# Patient Record
Sex: FEMALE
[De-identification: names, ages, dates, MRNs, and addresses within clinical notes are randomized per-mention and may not be internally consistent; named-entity substitution may affect disease eponyms.]

---

## 2019-02-04 ENCOUNTER — HOSPITAL ENCOUNTER (EMERGENCY)
Dept: HOSPITAL 14 - H.ER | Age: 24
Discharge: HOME | End: 2019-02-04
Payer: COMMERCIAL

## 2019-02-04 VITALS
SYSTOLIC BLOOD PRESSURE: 142 MMHG | DIASTOLIC BLOOD PRESSURE: 88 MMHG | OXYGEN SATURATION: 100 % | HEART RATE: 87 BPM | RESPIRATION RATE: 16 BRPM | TEMPERATURE: 98.8 F
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
## 2019-02-04 DIAGNOSIS — S61.012A: Primary | ICD-10-CM

## 2019-02-04 NOTE — ED PDOC
Upper Extremity Pain/Injury


Time Seen by Provider: 02/04/19 21:33


Chief Complaint (Nursing): Finger,Hand,&Wrist


Chief Complaint (Provider): left thumb injury


History Per: Patient


Additional Complaint(s): 


22 y/o F with no significant PMH who presents after cutting her Left thumb with 

broken glass while taking out recycling today. Patient is up to date on tetanus.

Denies numbness or tingling of hand. She has not taken any pain medication. 





Past Medical History


Reviewed: Historical Data, Nursing Documentation, Vital Signs


Vital Signs: 





                                Last Vital Signs











Temp  98.8 F   02/04/19 21:04


 


Pulse  87   02/04/19 21:04


 


Resp  16   02/04/19 21:04


 


BP  142/88   02/04/19 21:04


 


Pulse Ox  100   02/04/19 21:04














- Medical History


PMH: No Chronic Diseases





- Family History


Family History: States: Unknown Family Hx





- Allergies


Allergies/Adverse Reactions: 


                                    Allergies











Allergy/AdvReac Type Severity Reaction Status Date / Time


 


No Known Allergies Allergy   Verified 02/04/19 21:04














Review of Systems


Constitutional: Negative for: Fever


Musculoskeletal: Positive for: Hand Pain





Physical Exam





- Reviewed


Nursing Documentation Reviewed: Yes


Vital Signs Reviewed: Yes





- Physical Exam


Appears: Positive for: Well


Extremity: Positive for: Capillary Refill (< 2 sec), Other (Left thumb with 

approx 1cm horizontal superficial laceration at IP joint with easy skin 

apposition. no erythema/edema/bleeding.)


Neurologic/Psych: Positive for: Alert, Oriented





- ECG


O2 Sat by Pulse Oximetry: 100





Medical Decision Making


Medical Decision Making: 


Cleansed with 100cc and Left thumb laceration repaired with 1/2 in steri strips 

x 2. Bandage applied. 





Patient advised that steri-strips will fall off on their own in about 5 days and

to not pull them off. 





Pt stable for d/c home. 





Disposition





- Clinical Impression


Clinical Impression: 


 Thumb laceration








- Patient ED Disposition


Is Patient to be Admitted: No





- Disposition


Disposition: Routine/Home


Disposition Time: 22:14


Condition: STABLE


Additional Instructions: 


Return to ER if you have signs of pus drainage or fever. Take Tylenol or 

Ibuprofen for the pain. 


Instructions:  Wound Care (DC)


Forms:  [employer] (English)


Print Language: ENGLISH